# Patient Record
Sex: FEMALE | Race: WHITE | NOT HISPANIC OR LATINO
[De-identification: names, ages, dates, MRNs, and addresses within clinical notes are randomized per-mention and may not be internally consistent; named-entity substitution may affect disease eponyms.]

---

## 2020-10-22 ENCOUNTER — NON-APPOINTMENT (OUTPATIENT)
Age: 25
End: 2020-10-22

## 2020-10-22 ENCOUNTER — APPOINTMENT (OUTPATIENT)
Dept: OBGYN | Facility: CLINIC | Age: 25
End: 2020-10-22
Payer: COMMERCIAL

## 2020-10-22 VITALS
HEIGHT: 65 IN | BODY MASS INDEX: 28.49 KG/M2 | SYSTOLIC BLOOD PRESSURE: 118 MMHG | WEIGHT: 171 LBS | DIASTOLIC BLOOD PRESSURE: 72 MMHG

## 2020-10-22 PROCEDURE — 99204 OFFICE O/P NEW MOD 45 MIN: CPT

## 2020-10-22 PROCEDURE — 76815 OB US LIMITED FETUS(S): CPT

## 2020-10-22 PROCEDURE — 99072 ADDL SUPL MATRL&STAF TM PHE: CPT

## 2020-10-22 RX ORDER — PRENATAL WITH FERROUS FUM AND FOLIC ACID 3080; 920; 120; 400; 22; 1.84; 3; 20; 10; 1; 12; 200; 27; 25; 2 [IU]/1; [IU]/1; MG/1; [IU]/1; MG/1; MG/1; MG/1; MG/1; MG/1; MG/1; UG/1; MG/1; MG/1; MG/1; MG/1
27-1 TABLET ORAL DAILY
Qty: 100 | Refills: 2 | Status: ACTIVE | COMMUNITY
Start: 2020-10-22 | End: 1900-01-01

## 2020-10-23 DIAGNOSIS — N39.0 URINARY TRACT INFECTION, SITE NOT SPECIFIED: ICD-10-CM

## 2020-10-26 LAB — BACTERIA UR CULT: ABNORMAL

## 2020-10-26 RX ORDER — NITROFURANTOIN MACROCRYSTALS 100 MG/1
100 CAPSULE ORAL
Qty: 14 | Refills: 0 | Status: ACTIVE | COMMUNITY
Start: 2020-10-26 | End: 1900-01-01

## 2020-10-27 LAB
ABO + RH PNL BLD: NORMAL
APPEARANCE: CLEAR
BASOPHILS # BLD AUTO: 0.03 K/UL
BASOPHILS NFR BLD AUTO: 0.4 %
BILIRUBIN URINE: NEGATIVE
BLD GP AB SCN SERPL QL: NORMAL
BLOOD URINE: NEGATIVE
COLOR: YELLOW
EOSINOPHIL # BLD AUTO: 0.03 K/UL
EOSINOPHIL NFR BLD AUTO: 0.4 %
FMR1 GENE MUT ANL BLD/T: NORMAL
GLUCOSE 1H P 50 G GLC PO SERPL-MCNC: 124 MG/DL
GLUCOSE QUALITATIVE U: NEGATIVE
HBV SURFACE AG SER QL: NONREACTIVE
HCT VFR BLD CALC: 32.6 %
HGB A MFR BLD: 97.5 %
HGB A2 MFR BLD: 2.5 %
HGB BLD-MCNC: 10.5 G/DL
HGB FRACT BLD-IMP: NORMAL
HIV1+2 AB SPEC QL IA.RAPID: NONREACTIVE
IMM GRANULOCYTES NFR BLD AUTO: 0.4 %
KETONES URINE: NEGATIVE
LEAD BLD-MCNC: <1 UG/DL
LEUKOCYTE ESTERASE URINE: NEGATIVE
LYMPHOCYTES # BLD AUTO: 1.38 K/UL
LYMPHOCYTES NFR BLD AUTO: 20.2 %
M TB IFN-G BLD-IMP: NEGATIVE
MAN DIFF?: NORMAL
MCHC RBC-ENTMCNC: 30.6 PG
MCHC RBC-ENTMCNC: 32.2 G/DL
MCV RBC AUTO: 95 FL
MONOCYTES # BLD AUTO: 0.42 K/UL
MONOCYTES NFR BLD AUTO: 6.1 %
NEUTROPHILS # BLD AUTO: 4.95 K/UL
NEUTROPHILS NFR BLD AUTO: 72.5 %
NITRITE URINE: NEGATIVE
PH URINE: 8
PLATELET # BLD AUTO: 165 K/UL
PROTEIN URINE: NORMAL
QUANTIFERON TB PLUS MITOGEN MINUS NIL: 1.81 IU/ML
QUANTIFERON TB PLUS NIL: 0.01 IU/ML
QUANTIFERON TB PLUS TB1 MINUS NIL: 0 IU/ML
QUANTIFERON TB PLUS TB2 MINUS NIL: 0 IU/ML
RBC # BLD: 3.43 M/UL
RBC # FLD: 13.4 %
RUBV IGG FLD-ACNC: 2.5 INDEX
RUBV IGG SER-IMP: POSITIVE
SPECIFIC GRAVITY URINE: 1.02
T PALLIDUM AB SER QL IA: NEGATIVE
UROBILINOGEN URINE: NORMAL
VZV AB TITR SER: POSITIVE
VZV IGG SER IF-ACNC: 365.6 INDEX
WBC # FLD AUTO: 6.84 K/UL

## 2020-10-28 LAB
A VAGINAE DNA VAG QL NAA+PROBE: NORMAL
AR GENE MUT ANL BLD/T: NORMAL
BVAB2 DNA VAG QL NAA+PROBE: NORMAL
C KRUSEI DNA VAG QL NAA+PROBE: NEGATIVE
C TRACH RRNA SPEC QL NAA+PROBE: NEGATIVE
MEGA1 DNA VAG QL NAA+PROBE: ABNORMAL
N GONORRHOEA RRNA SPEC QL NAA+PROBE: NEGATIVE
T VAGINALIS RRNA SPEC QL NAA+PROBE: NEGATIVE

## 2020-11-02 LAB
CFTR MUT TESTED BLD/T: NEGATIVE
CLARI ADDITIONAL INFO: NORMAL
CLARI CHROMOSOME 13: NORMAL
CLARI CHROMOSOME 18: NORMAL
CLARI CHROMOSOME 21: NORMAL
CLARI SEX CHROMOSOMES: NORMAL
CLARITEST NIPT: NORMAL

## 2020-11-05 ENCOUNTER — NON-APPOINTMENT (OUTPATIENT)
Age: 25
End: 2020-11-05

## 2020-11-05 ENCOUNTER — APPOINTMENT (OUTPATIENT)
Dept: OBGYN | Facility: CLINIC | Age: 25
End: 2020-11-05
Payer: COMMERCIAL

## 2020-11-05 VITALS
BODY MASS INDEX: 26.99 KG/M2 | HEIGHT: 65 IN | SYSTOLIC BLOOD PRESSURE: 118 MMHG | DIASTOLIC BLOOD PRESSURE: 76 MMHG | WEIGHT: 162 LBS

## 2020-11-05 PROCEDURE — 0502F SUBSEQUENT PRENATAL CARE: CPT

## 2020-11-09 ENCOUNTER — OUTPATIENT (OUTPATIENT)
Dept: OUTPATIENT SERVICES | Facility: HOSPITAL | Age: 25
LOS: 1 days | Discharge: HOME | End: 2020-11-09

## 2020-11-09 ENCOUNTER — APPOINTMENT (OUTPATIENT)
Dept: ANTEPARTUM | Facility: CLINIC | Age: 25
End: 2020-11-09
Payer: COMMERCIAL

## 2020-11-09 ENCOUNTER — ASOB RESULT (OUTPATIENT)
Age: 25
End: 2020-11-09

## 2020-11-09 PROCEDURE — 76805 OB US >/= 14 WKS SNGL FETUS: CPT | Mod: 26

## 2020-11-19 ENCOUNTER — NON-APPOINTMENT (OUTPATIENT)
Age: 25
End: 2020-11-19

## 2020-11-19 ENCOUNTER — APPOINTMENT (OUTPATIENT)
Dept: OBGYN | Facility: CLINIC | Age: 25
End: 2020-11-19
Payer: COMMERCIAL

## 2020-11-19 VITALS
SYSTOLIC BLOOD PRESSURE: 110 MMHG | WEIGHT: 164 LBS | HEIGHT: 65 IN | BODY MASS INDEX: 27.32 KG/M2 | DIASTOLIC BLOOD PRESSURE: 66 MMHG

## 2020-11-19 PROCEDURE — 0502F SUBSEQUENT PRENATAL CARE: CPT

## 2020-11-22 LAB
GP B STREP DNA SPEC QL NAA+PROBE: NORMAL
GP B STREP DNA SPEC QL NAA+PROBE: NOT DETECTED
SOURCE GBS: NORMAL

## 2020-11-23 LAB
A VAGINAE DNA VAG QL NAA+PROBE: NORMAL
BVAB2 DNA VAG QL NAA+PROBE: NORMAL
C KRUSEI DNA VAG QL NAA+PROBE: NEGATIVE
C TRACH RRNA SPEC QL NAA+PROBE: NEGATIVE
MEGA1 DNA VAG QL NAA+PROBE: ABNORMAL
N GONORRHOEA RRNA SPEC QL NAA+PROBE: NEGATIVE
T VAGINALIS RRNA SPEC QL NAA+PROBE: NEGATIVE

## 2020-12-01 ENCOUNTER — OUTPATIENT (OUTPATIENT)
Dept: OUTPATIENT SERVICES | Facility: HOSPITAL | Age: 25
LOS: 1 days | Discharge: HOME | End: 2020-12-01

## 2020-12-01 ENCOUNTER — APPOINTMENT (OUTPATIENT)
Dept: ANTEPARTUM | Facility: CLINIC | Age: 25
End: 2020-12-01
Payer: COMMERCIAL

## 2020-12-01 ENCOUNTER — ASOB RESULT (OUTPATIENT)
Age: 25
End: 2020-12-01

## 2020-12-01 PROCEDURE — 76816 OB US FOLLOW-UP PER FETUS: CPT | Mod: 26

## 2020-12-01 PROCEDURE — 76819 FETAL BIOPHYS PROFIL W/O NST: CPT | Mod: 26

## 2020-12-03 ENCOUNTER — NON-APPOINTMENT (OUTPATIENT)
Age: 25
End: 2020-12-03

## 2020-12-03 ENCOUNTER — APPOINTMENT (OUTPATIENT)
Dept: OBGYN | Facility: CLINIC | Age: 25
End: 2020-12-03
Payer: COMMERCIAL

## 2020-12-03 VITALS
BODY MASS INDEX: 27.99 KG/M2 | HEIGHT: 65 IN | WEIGHT: 168 LBS | DIASTOLIC BLOOD PRESSURE: 62 MMHG | SYSTOLIC BLOOD PRESSURE: 118 MMHG

## 2020-12-03 PROCEDURE — 0502F SUBSEQUENT PRENATAL CARE: CPT

## 2020-12-03 RX ORDER — PRENATAL WITH FERROUS FUM AND FOLIC ACID 3080; 920; 120; 400; 22; 1.84; 3; 20; 10; 1; 12; 200; 27; 25; 2 [IU]/1; [IU]/1; MG/1; [IU]/1; MG/1; MG/1; MG/1; MG/1; MG/1; MG/1; UG/1; MG/1; MG/1; MG/1; MG/1
27-1 TABLET ORAL DAILY
Qty: 30 | Refills: 11 | Status: ACTIVE | COMMUNITY
Start: 2020-12-03 | End: 1900-01-01

## 2020-12-07 ENCOUNTER — APPOINTMENT (OUTPATIENT)
Dept: ANTEPARTUM | Facility: CLINIC | Age: 25
End: 2020-12-07

## 2020-12-10 ENCOUNTER — APPOINTMENT (OUTPATIENT)
Dept: OBGYN | Facility: CLINIC | Age: 25
End: 2020-12-10
Payer: COMMERCIAL

## 2020-12-10 ENCOUNTER — NON-APPOINTMENT (OUTPATIENT)
Age: 25
End: 2020-12-10

## 2020-12-10 VITALS
BODY MASS INDEX: 28.32 KG/M2 | WEIGHT: 170 LBS | SYSTOLIC BLOOD PRESSURE: 122 MMHG | HEIGHT: 65 IN | DIASTOLIC BLOOD PRESSURE: 78 MMHG

## 2020-12-10 PROCEDURE — ZZZZZ: CPT

## 2020-12-13 ENCOUNTER — NON-APPOINTMENT (OUTPATIENT)
Age: 25
End: 2020-12-13

## 2020-12-21 ENCOUNTER — RESULT CHARGE (OUTPATIENT)
Age: 25
End: 2020-12-21

## 2020-12-21 ENCOUNTER — NON-APPOINTMENT (OUTPATIENT)
Age: 25
End: 2020-12-21

## 2020-12-21 ENCOUNTER — APPOINTMENT (OUTPATIENT)
Dept: OBGYN | Facility: CLINIC | Age: 25
End: 2020-12-21
Payer: COMMERCIAL

## 2020-12-21 ENCOUNTER — OUTPATIENT (OUTPATIENT)
Dept: OUTPATIENT SERVICES | Facility: HOSPITAL | Age: 25
LOS: 1 days | Discharge: HOME | End: 2020-12-21

## 2020-12-21 VITALS — SYSTOLIC BLOOD PRESSURE: 120 MMHG | DIASTOLIC BLOOD PRESSURE: 70 MMHG | WEIGHT: 173 LBS | BODY MASS INDEX: 28.79 KG/M2

## 2020-12-21 DIAGNOSIS — Z34.90 ENCOUNTER FOR SUPERVISION OF NORMAL PREGNANCY, UNSPECIFIED, UNSPECIFIED TRIMESTER: ICD-10-CM

## 2020-12-21 LAB
BILIRUB UR QL STRIP: NORMAL
CLARITY UR: CLEAR
COLLECTION METHOD: NORMAL
GLUCOSE UR-MCNC: NORMAL
HCG UR QL: 0.2 EU/DL
HGB UR QL STRIP.AUTO: NORMAL
KETONES UR-MCNC: NORMAL
LEUKOCYTE ESTERASE UR QL STRIP: NORMAL
NITRITE UR QL STRIP: NORMAL
PH UR STRIP: 6
PROT UR STRIP-MCNC: NORMAL
SP GR UR STRIP: 1.03

## 2020-12-21 PROCEDURE — 59425 ANTEPARTUM CARE ONLY: CPT

## 2020-12-21 PROCEDURE — 0502F SUBSEQUENT PRENATAL CARE: CPT

## 2020-12-23 ENCOUNTER — INPATIENT (INPATIENT)
Facility: HOSPITAL | Age: 25
LOS: 1 days | Discharge: HOME | End: 2020-12-25
Attending: OBSTETRICS & GYNECOLOGY | Admitting: OBSTETRICS & GYNECOLOGY
Payer: COMMERCIAL

## 2020-12-23 VITALS — DIASTOLIC BLOOD PRESSURE: 75 MMHG | HEART RATE: 97 BPM | SYSTOLIC BLOOD PRESSURE: 123 MMHG

## 2020-12-23 DIAGNOSIS — Z90.3 ACQUIRED ABSENCE OF STOMACH [PART OF]: Chronic | ICD-10-CM

## 2020-12-23 PROBLEM — N39.0 ACUTE UTI: Status: RESOLVED | Noted: 2020-10-26 | Resolved: 2020-12-23

## 2020-12-23 LAB
AMPHET UR-MCNC: NEGATIVE — SIGNIFICANT CHANGE UP
APPEARANCE UR: CLEAR — SIGNIFICANT CHANGE UP
BARBITURATES UR SCN-MCNC: NEGATIVE — SIGNIFICANT CHANGE UP
BASOPHILS # BLD AUTO: 0.03 K/UL — SIGNIFICANT CHANGE UP (ref 0–0.2)
BASOPHILS NFR BLD AUTO: 0.4 % — SIGNIFICANT CHANGE UP (ref 0–1)
BENZODIAZ UR-MCNC: NEGATIVE — SIGNIFICANT CHANGE UP
BILIRUB UR-MCNC: NEGATIVE — SIGNIFICANT CHANGE UP
BLD GP AB SCN SERPL QL: SIGNIFICANT CHANGE UP
BUPRENORPHINE SCREEN, URINE RESULT: NEGATIVE — SIGNIFICANT CHANGE UP
COCAINE METAB.OTHER UR-MCNC: NEGATIVE — SIGNIFICANT CHANGE UP
COLOR SPEC: SIGNIFICANT CHANGE UP
DIFF PNL FLD: NEGATIVE — SIGNIFICANT CHANGE UP
EOSINOPHIL # BLD AUTO: 0.04 K/UL — SIGNIFICANT CHANGE UP (ref 0–0.7)
EOSINOPHIL NFR BLD AUTO: 0.6 % — SIGNIFICANT CHANGE UP (ref 0–8)
GLUCOSE UR QL: NEGATIVE — SIGNIFICANT CHANGE UP
HCT VFR BLD CALC: 32.5 % — LOW (ref 37–47)
HGB BLD-MCNC: 10.7 G/DL — LOW (ref 12–16)
IMM GRANULOCYTES NFR BLD AUTO: 0.7 % — HIGH (ref 0.1–0.3)
KETONES UR-MCNC: NEGATIVE — SIGNIFICANT CHANGE UP
L&D DRUG SCREEN, URINE: SIGNIFICANT CHANGE UP
LEUKOCYTE ESTERASE UR-ACNC: NEGATIVE — SIGNIFICANT CHANGE UP
LYMPHOCYTES # BLD AUTO: 2 K/UL — SIGNIFICANT CHANGE UP (ref 1.2–3.4)
LYMPHOCYTES # BLD AUTO: 29.4 % — SIGNIFICANT CHANGE UP (ref 20.5–51.1)
MCHC RBC-ENTMCNC: 29.8 PG — SIGNIFICANT CHANGE UP (ref 27–31)
MCHC RBC-ENTMCNC: 32.9 G/DL — SIGNIFICANT CHANGE UP (ref 32–37)
MCV RBC AUTO: 90.5 FL — SIGNIFICANT CHANGE UP (ref 81–99)
METHADONE UR-MCNC: NEGATIVE — SIGNIFICANT CHANGE UP
MONOCYTES # BLD AUTO: 0.59 K/UL — SIGNIFICANT CHANGE UP (ref 0.1–0.6)
MONOCYTES NFR BLD AUTO: 8.7 % — SIGNIFICANT CHANGE UP (ref 1.7–9.3)
NEUTROPHILS # BLD AUTO: 4.09 K/UL — SIGNIFICANT CHANGE UP (ref 1.4–6.5)
NEUTROPHILS NFR BLD AUTO: 60.2 % — SIGNIFICANT CHANGE UP (ref 42.2–75.2)
NITRITE UR-MCNC: NEGATIVE — SIGNIFICANT CHANGE UP
NRBC # BLD: 0 /100 WBCS — SIGNIFICANT CHANGE UP (ref 0–0)
OPIATES UR-MCNC: NEGATIVE — SIGNIFICANT CHANGE UP
OXYCODONE UR-MCNC: NEGATIVE — SIGNIFICANT CHANGE UP
PCP UR-MCNC: NEGATIVE — SIGNIFICANT CHANGE UP
PH UR: 7.5 — SIGNIFICANT CHANGE UP (ref 5–8)
PLATELET # BLD AUTO: 195 K/UL — SIGNIFICANT CHANGE UP (ref 130–400)
PRENATAL SYPHILIS TEST: SIGNIFICANT CHANGE UP
PROPOXYPHENE QUALITATIVE URINE RESULT: NEGATIVE — SIGNIFICANT CHANGE UP
PROT UR-MCNC: SIGNIFICANT CHANGE UP
RBC # BLD: 3.59 M/UL — LOW (ref 4.2–5.4)
RBC # FLD: 12.9 % — SIGNIFICANT CHANGE UP (ref 11.5–14.5)
SARS-COV-2 RNA SPEC QL NAA+PROBE: SIGNIFICANT CHANGE UP
SP GR SPEC: 1.02 — SIGNIFICANT CHANGE UP (ref 1.01–1.03)
UROBILINOGEN FLD QL: SIGNIFICANT CHANGE UP
WBC # BLD: 6.8 K/UL — SIGNIFICANT CHANGE UP (ref 4.8–10.8)
WBC # FLD AUTO: 6.8 K/UL — SIGNIFICANT CHANGE UP (ref 4.8–10.8)

## 2020-12-23 RX ORDER — SODIUM CHLORIDE 9 MG/ML
1000 INJECTION, SOLUTION INTRAVENOUS
Refills: 0 | Status: DISCONTINUED | OUTPATIENT
Start: 2020-12-23 | End: 2020-12-24

## 2020-12-23 RX ORDER — DINOPROSTONE 10 MG/241MG
10 INSERT VAGINAL ONCE
Refills: 0 | Status: COMPLETED | OUTPATIENT
Start: 2020-12-23 | End: 2020-12-23

## 2020-12-23 RX ORDER — OXYTOCIN 10 UNIT/ML
333.33 VIAL (ML) INJECTION
Qty: 20 | Refills: 0 | Status: DISCONTINUED | OUTPATIENT
Start: 2020-12-23 | End: 2020-12-24

## 2020-12-23 RX ADMIN — DINOPROSTONE 10 MILLIGRAM(S): 10 INSERT VAGINAL at 10:30

## 2020-12-23 NOTE — PROGRESS NOTE ADULT - ASSESSMENT
25y  at 40w5d, GBS neg, here for IOL for postdates, s/p cervidil now removed, will have patient rest for 1 hr before starting pitocin, in labor progressing well.  -Continue current management   -Pain management prn  -Continuous EFM/toco  -F/u pending labs  -Reevaluate      and  aware

## 2020-12-23 NOTE — PROGRESS NOTE ADULT - SUBJECTIVE AND OBJECTIVE BOX
PGY1 Note    Patient seen at bedside for evaluation of labor progression. patient currently undergoing resuscitation with oxygen.    T(F): 98.6 ( @ 19:14), Max: 98.6 ( @ 19:14)  HR: 80 ( @ 21:54)  BP: 126/68 ( @ 21:54) (111/75 - 137/78)  RR: 20 ( @ 08:48)  EFM: 140bpm/moderate variability/+accelerations/ intermittent late and early decelerations  TOCO: q2mins  SVE: 3-4/80/-2 vtx ruptured    Medications:  ([place]): 1 ( @ 18:56)  dinoprostone Insert: 10 ( @ 10:30)  epidural @1915    Labs:                        10.7   6.80  )-----------( 195      ( 23 Dec 2020 09:26 )             32.5           Prenatal Syphilis Test: Nonreact ( @ 09:26)  Antibody Screen: NEG (20 @ 09:52)    Urinalysis Basic - ( 23 Dec 2020 13:31 )    Color: Light Yellow / Appearance: Clear / S.016 / pH: x  Gluc: x / Ketone: Negative  / Bili: Negative / Urobili: <2 mg/dL   Blood: x / Protein: Trace / Nitrite: Negative   Leuk Esterase: Negative / RBC: x / WBC x   Sq Epi: x / Non Sq Epi: x / Bacteria: x      ABO: A pos antibody neg  UDS neg f/u fentanyl

## 2020-12-23 NOTE — PROGRESS NOTE ADULT - ASSESSMENT
26yo  @ 40w1d by 3rd trimester sono, GBS negative, late registrant, with cervidil in place for post-EDC IOL.    - Cont EFM/[place]  - IV Hydration  - Pain Management prn  - Monitor vitals  - Clear Liquids    Dr. Adhikari and Dr. Castro aware

## 2020-12-23 NOTE — PROGRESS NOTE ADULT - ASSESSMENT
25y  at 40w5d, GBS neg, here for IOL for postdates, s/p cervidil and epidural, with category 2 tracing, undergoing resuscitation.  -Continue to resuscitate as needed  -Pain management with epidural  -Continuous EFM/toco  -IVF hydration, clears  -F/u fentanyl in UDS  -Reevaluate      and Dr. Robledo aware   25y  at 40w5d, GBS neg, here for IOL for postdates, s/p cervidil and epidural, with category 2 tracing, undergoing resuscitation.  -Continue to resuscitate as needed  -Pain management with epidural  -Continuous EFM/toco  -IVF hydration, clears  -F/u fentanyl in UDS  -Reevaluate .     and Dr. Robledo aware

## 2020-12-23 NOTE — PROGRESS NOTE ADULT - SUBJECTIVE AND OBJECTIVE BOX
PGY I Note    S: Patient seen and examined at bedside. Doing well, does not desire pain control at this time.    Vital Signs Last 24 Hrs  T(C): 36.9 (23 Dec 2020 08:48), Max: 36.9 (23 Dec 2020 08:48)  T(F): 98.4 (23 Dec 2020 08:48), Max: 98.4 (23 Dec 2020 08:48)  HR: 79 (23 Dec 2020 10:12) (79 - 97)  BP: 125/84 (23 Dec 2020 10:12) (123/75 - 125/84)  RR: 20 (23 Dec 2020 08:48) (20 - 20)    EFM: 130/mod/+accels  TOCO: irregular  SVE: c/l/p per PA Panetelios    Labs:                        10.7   6.80  )-----------( 195      ( 23 Dec 2020 09:26 )             32.5             ABO RH Interpretation: A POS (20 @ 09:52)    Urinalysis Basic - ( 23 Dec 2020 13:31 )    Color: Light Yellow / Appearance: Clear / S.016 / pH: x  Gluc: x / Ketone: Negative  / Bili: Negative / Urobili: <2 mg/dL   Blood: x / Protein: Trace / Nitrite: Negative   Leuk Esterase: Negative / RBC: x / WBC x   Sq Epi: x / Non Sq Epi: x / Bacteria: x        Prenatal Syphilis Test: Nonreact (20 @ 09:26)      UDS: pending  Covid: neg    Meds:  cervidil in @1030

## 2020-12-23 NOTE — OB PROVIDER H&P - ASSESSMENT
25y.o.  @ 40.1wks by 3rd trimester sono, GBS negative, late registrant  Admit to L&D  IVF, labs  Continuous efm and toco  Pain management PRN  Anticipate   Dr. Zeynep Gray

## 2020-12-23 NOTE — PROCEDURE NOTE - ADDITIONAL PROCEDURE DETAILS
00.05am Top off given to pt. 100mcg fentanyl with 5cc .25% bupivacaine. 00.05am Top off given to pt. 100mcg fentanyl with 5cc .25% bupivacaine.  06.30am Top off given to pt. 100mcg fentanyl with 5cc .2% lidocaine. 00.05am Top off given to pt. 100mcg fentanyl with 5cc .25% bupivacaine.  06.30am Top off given to pt. 100mcg fentanyl with 5cc .2% lidocaine.  0810 am Top off given to pt. 2% chloroprocaine 9 cc, 8.4% sodium bicarbonate 1cc

## 2020-12-23 NOTE — PROGRESS NOTE ADULT - SUBJECTIVE AND OBJECTIVE BOX
PGY1 Note    Patient seen at bedside for labor progression. Complaining of increased pain.     T(F): 98.4 ( @ 08:48), Max: 98.4 ( @ 08:48)  HR: 70 ( @ 16:04)  BP: 129/79 ( @ 16:04) (123/75 - 129/79)  RR: 20 ( @ 08:48)    EFM: 135/mod rachael/+accels  TOCO: q1-3min   SVE: 2/50/-3/vertex/intact    Medications:  dinoprostone Insert: 10 ( @ 10:30), removed @1750      Labs:                        10.7   6.80  )-----------( 195      ( 23 Dec 2020 09:26 )             32.5           Prenatal Syphilis Test: Nonreact ( @ 09:26)  Antibody Screen: NEG (20 @ 09:52)    Urinalysis Basic - ( 23 Dec 2020 13:31 )    Color: Light Yellow / Appearance: Clear / S.016 / pH: x  Gluc: x / Ketone: Negative  / Bili: Negative / Urobili: <2 mg/dL   Blood: x / Protein: Trace / Nitrite: Negative   Leuk Esterase: Negative / RBC: x / WBC x   Sq Epi: x / Non Sq Epi: x / Bacteria: x    covid neg  UDS pending (done)

## 2020-12-23 NOTE — OB PROVIDER H&P - NSHPPHYSICALEXAM_GEN_ALL_CORE
T(C): 36.9 (12-23-20 @ 08:48), Max: 36.9 (12-23-20 @ 08:48)  HR: 97 (12-23-20 @ 08:48) (97 - 97)  BP: 123/75 (12-23-20 @ 08:48) (123/75 - 123/75)  RR: 20 (12-23-20 @ 08:48) (20 - 20)    VE:   FHR: 130 moderate variability, cat I  ctx: none noted T(C): 36.9 (12-23-20 @ 08:48), Max: 36.9 (12-23-20 @ 08:48)  HR: 97 (12-23-20 @ 08:48) (97 - 97)  BP: 123/75 (12-23-20 @ 08:48) (123/75 - 123/75)  RR: 20 (12-23-20 @ 08:48) (20 - 20)    VE: 0/0/-3/firm/post  FHR: 130 moderate variability, cat I  ctx: none noted

## 2020-12-23 NOTE — OB PROVIDER H&P - NSHPLABSRESULTS_GEN_ALL_CORE
Sono @ 31.6wks for dating  Sono @ 34.2wks S=D, nl anatomy, vtx, ant placenta, EFW: 2254g, 5lb1oz (31%)  Sono @ 37.3wks S=D, EFW: 2927g, 6lb7oz (31%)        NIPT WNL

## 2020-12-24 PROCEDURE — 59409 OBSTETRICAL CARE: CPT | Mod: U9

## 2020-12-24 RX ORDER — LANOLIN
1 OINTMENT (GRAM) TOPICAL EVERY 6 HOURS
Refills: 0 | Status: DISCONTINUED | OUTPATIENT
Start: 2020-12-24 | End: 2020-12-25

## 2020-12-24 RX ORDER — DIPHENHYDRAMINE HCL 50 MG
25 CAPSULE ORAL EVERY 6 HOURS
Refills: 0 | Status: DISCONTINUED | OUTPATIENT
Start: 2020-12-24 | End: 2020-12-25

## 2020-12-24 RX ORDER — IBUPROFEN 200 MG
600 TABLET ORAL EVERY 6 HOURS
Refills: 0 | Status: DISCONTINUED | OUTPATIENT
Start: 2020-12-24 | End: 2020-12-25

## 2020-12-24 RX ORDER — AER TRAVELER 0.5 G/1
1 SOLUTION RECTAL; TOPICAL EVERY 4 HOURS
Refills: 0 | Status: DISCONTINUED | OUTPATIENT
Start: 2020-12-24 | End: 2020-12-25

## 2020-12-24 RX ORDER — SIMETHICONE 80 MG/1
80 TABLET, CHEWABLE ORAL EVERY 4 HOURS
Refills: 0 | Status: DISCONTINUED | OUTPATIENT
Start: 2020-12-24 | End: 2020-12-25

## 2020-12-24 RX ORDER — OXYCODONE HYDROCHLORIDE 5 MG/1
5 TABLET ORAL
Refills: 0 | Status: DISCONTINUED | OUTPATIENT
Start: 2020-12-24 | End: 2020-12-25

## 2020-12-24 RX ORDER — OXYTOCIN 10 UNIT/ML
41.67 VIAL (ML) INJECTION
Qty: 20 | Refills: 0 | Status: DISCONTINUED | OUTPATIENT
Start: 2020-12-24 | End: 2020-12-25

## 2020-12-24 RX ORDER — DIBUCAINE 1 %
1 OINTMENT (GRAM) RECTAL EVERY 6 HOURS
Refills: 0 | Status: DISCONTINUED | OUTPATIENT
Start: 2020-12-24 | End: 2020-12-25

## 2020-12-24 RX ORDER — ACETAMINOPHEN 500 MG
975 TABLET ORAL
Refills: 0 | Status: DISCONTINUED | OUTPATIENT
Start: 2020-12-24 | End: 2020-12-25

## 2020-12-24 RX ORDER — OXYCODONE HYDROCHLORIDE 5 MG/1
5 TABLET ORAL ONCE
Refills: 0 | Status: DISCONTINUED | OUTPATIENT
Start: 2020-12-24 | End: 2020-12-25

## 2020-12-24 RX ORDER — IBUPROFEN 200 MG
600 TABLET ORAL EVERY 6 HOURS
Refills: 0 | Status: COMPLETED | OUTPATIENT
Start: 2020-12-24 | End: 2021-11-22

## 2020-12-24 RX ORDER — OXYTOCIN 10 UNIT/ML
2 VIAL (ML) INJECTION
Qty: 30 | Refills: 0 | Status: DISCONTINUED | OUTPATIENT
Start: 2020-12-24 | End: 2020-12-24

## 2020-12-24 RX ORDER — HYDROCORTISONE 1 %
1 OINTMENT (GRAM) TOPICAL EVERY 6 HOURS
Refills: 0 | Status: DISCONTINUED | OUTPATIENT
Start: 2020-12-24 | End: 2020-12-25

## 2020-12-24 RX ORDER — MAGNESIUM HYDROXIDE 400 MG/1
30 TABLET, CHEWABLE ORAL
Refills: 0 | Status: DISCONTINUED | OUTPATIENT
Start: 2020-12-24 | End: 2020-12-25

## 2020-12-24 RX ORDER — KETOROLAC TROMETHAMINE 30 MG/ML
30 SYRINGE (ML) INJECTION ONCE
Refills: 0 | Status: DISCONTINUED | OUTPATIENT
Start: 2020-12-24 | End: 2020-12-24

## 2020-12-24 RX ORDER — BENZOCAINE 10 %
1 GEL (GRAM) MUCOUS MEMBRANE EVERY 6 HOURS
Refills: 0 | Status: DISCONTINUED | OUTPATIENT
Start: 2020-12-24 | End: 2020-12-25

## 2020-12-24 RX ADMIN — Medication 2 MILLIUNIT(S)/MIN: at 03:00

## 2020-12-24 RX ADMIN — Medication 30 MILLIGRAM(S): at 10:47

## 2020-12-24 NOTE — PROGRESS NOTE ADULT - ASSESSMENT
25y  at 40w6d, GBS neg, s/p cervidil, on pitocin for IOL for Post-EDC, now fully dilated.     Will call for top-off to better control patient pain prior to pushing.    - Cont EFM/[place]  - IV Hydration  - Pain Management epidural  - Monitor vitals  - Clear Liquids    Dr. Garcia and Dr. Castro aware

## 2020-12-24 NOTE — PROGRESS NOTE ADULT - SUBJECTIVE AND OBJECTIVE BOX
PGY1 Note    Patient seen at bedside for increased pelvic pain with contractions. Patient still undergoing resuscitation with oxygen    T(F): 98.6 ( @ 19:14), Max: 98.6 ( @ 19:14)  HR: 80 ( @ 23:53)  BP: 133/91 ( @ 00:11) (111/75 - 137/78)  RR: 20 ( @ 08:48)  EFM:125bpm/moderate variability/+accelerations/intermittent late and early decelerations  TOCO: q1-2mins  SVE:4/80/-2 vtx ruptured    Medications:      Labs:                        10.7   6.80  )-----------( 195      ( 23 Dec 2020 09:26 )             32.5           Prenatal Syphilis Test: Nonreact ( @ 09:26)  Antibody Screen: NEG (20 @ 09:52)    Urinalysis Basic - ( 23 Dec 2020 13:31 )    Color: Light Yellow / Appearance: Clear / S.016 / pH: x  Gluc: x / Ketone: Negative  / Bili: Negative / Urobili: <2 mg/dL   Blood: x / Protein: Trace / Nitrite: Negative   Leuk Esterase: Negative / RBC: x / WBC x   Sq Epi: x / Non Sq Epi: x / Bacteria: x      ABO: A pos antibody neg  UDS neg f/u fentanyl         PGY1 Note    Patient seen at bedside for increased pelvic pain with contractions. Patient still undergoing resuscitation with oxygen    T(F): 98.6 ( @ 19:14), Max: 98.6 ( @ 19:14)  HR: 80 ( @ 23:53)  BP: 133/91 ( @ 00:11) (111/75 - 137/78)  RR: 20 ( @ 08:48)  EFM:125bpm/moderate variability/+accelerations/ early decelerations  TOCO: q1-2mins  SVE:4/80/-2 vtx ruptured    Medications:      Labs:                        10.7   6.80  )-----------( 195      ( 23 Dec 2020 09:26 )             32.5           Prenatal Syphilis Test: Nonreact ( @ 09:26)  Antibody Screen: NEG (20 @ 09:52)    Urinalysis Basic - ( 23 Dec 2020 13:31 )    Color: Light Yellow / Appearance: Clear / S.016 / pH: x  Gluc: x / Ketone: Negative  / Bili: Negative / Urobili: <2 mg/dL   Blood: x / Protein: Trace / Nitrite: Negative   Leuk Esterase: Negative / RBC: x / WBC x   Sq Epi: x / Non Sq Epi: x / Bacteria: x      ABO: A pos antibody neg  UDS neg f/u fentanyl

## 2020-12-24 NOTE — PROGRESS NOTE ADULT - SUBJECTIVE AND OBJECTIVE BOX
PGY2 Note    Patient seen at bedside. No complaints at the moment.    T(F): 98.6 ( @ 19:14), Max: 98.6 ( @ 19:14)  HR: 71 ( @ 02:24)  BP: 115/69 ( @ 02:39) (102/60 - 137/78)  RR: 20 ( @ 08:48)    EFM: 125bpm/moderate variability/+decels  TOCO: q2-4mins  SVE: 4/80/-2, vtx, ruptured    Medications:  dinoprostone Insert: 10 ( @ 10:30)      Labs:                        10.7   6.80  )-----------( 195      ( 23 Dec 2020 09:26 )             32.5           Prenatal Syphilis Test: Nonreact ( @ 09:26)  Antibody Screen: NEG (20 @ 09:52)    Urinalysis Basic - ( 23 Dec 2020 13:31 )    Color: Light Yellow / Appearance: Clear / S.016 / pH: x  Gluc: x / Ketone: Negative  / Bili: Negative / Urobili: <2 mg/dL   Blood: x / Protein: Trace / Nitrite: Negative   Leuk Esterase: Negative / RBC: x / WBC x   Sq Epi: x / Non Sq Epi: x / Bacteria: x         PGY2 Note    Patient seen at bedside. No complaints at the moment.    T(F): 98.6 ( @ 19:14), Max: 98.6 ( @ 19:14)  HR: 71 ( @ 02:24)  BP: 115/69 ( @ 02:39) (102/60 - 137/78)  RR: 20 ( @ 08:48)    EFM: 125bpm/moderate variability/+accels/ early decelerations  TOCO: q2-4mins  SVE: 4/80/-2, vtx, ruptured    Medications:  dinoprostone Insert: 10 ( @ 10:30)      Labs:                        10.7   6.80  )-----------( 195      ( 23 Dec 2020 09:26 )             32.5           Prenatal Syphilis Test: Nonreact ( @ 09:26)  Antibody Screen: NEG (20 @ 09:52)    Urinalysis Basic - ( 23 Dec 2020 13:31 )    Color: Light Yellow / Appearance: Clear / S.016 / pH: x  Gluc: x / Ketone: Negative  / Bili: Negative / Urobili: <2 mg/dL   Blood: x / Protein: Trace / Nitrite: Negative   Leuk Esterase: Negative / RBC: x / WBC x   Sq Epi: x / Non Sq Epi: x / Bacteria: x

## 2020-12-24 NOTE — PROGRESS NOTE ADULT - SUBJECTIVE AND OBJECTIVE BOX
Pt complaining of pain    ICU Vital Signs Last 24 Hrs  T(C): 37.0 (24 Dec 2020 07:09), Max: 37.0 (23 Dec 2020 19:14)  T(F): 98.6 (24 Dec 2020 07:09), Max: 98.6 (23 Dec 2020 19:14)  HR: 83 (24 Dec 2020 08:25) (57 - 109)  BP: 125/77 (24 Dec 2020 08:25) (102/60 - 141/82)    RR: 20 (23 Dec 2020 08:48) (20 - 20)      TOCO Q 3- 4   VE 9.5/100/-0 vertex    A/P  26 y/o  @ 40.6, s/p cervidil IOL  Patient will rest and will reevaluate when pressure increases  - IVH   - efm & toco monitoring  - analgesia prn  - Dr Garcia aware

## 2020-12-24 NOTE — PROGRESS NOTE ADULT - ASSESSMENT
25y  at 40w6d GA, GBS neg, here for IOL for postdates, s/p cervidil, s/p AROM; induction progressing well   -start pitocin   -Pain management prn   -Continuous EFM/toco  -IVF hydration  -diet: clears  -F/u fentanyl in UDS  -COVID-19 neg     Dr. Robledo and Dr. Gonsalves aware  25y  at 40w6d GA, GBS neg, here for IOL for postdates, s/p cervidil, s/p AROM;     -start pitocin   -Pain management prn   -Continuous EFM/toco  -IVF hydration  -diet: clear liquid diet  -F/u fentanyl in UDS  -COVID-19 neg     Dr. Robledo and Dr. Gonsalves aware

## 2020-12-24 NOTE — PROGRESS NOTE ADULT - SUBJECTIVE AND OBJECTIVE BOX
PGY I Note    S: Patient seen and examined at bedside. Strong pain with contractions.    Vital Signs Last 24 Hrs  T(C): 37.0 (24 Dec 2020 07:09), Max: 37.0 (23 Dec 2020 19:14)  T(F): 98.6 (24 Dec 2020 07:09), Max: 98.6 (23 Dec 2020 19:14)  HR: 80 (24 Dec 2020 08:10) (57 - 109)  BP: 113/71 (24 Dec 2020 08:10) (102/60 - 141/82)  RR: 20 (23 Dec 2020 08:48) (20 - 20)    EFM: 130/mod/+accels  TOCO: q2m  SVE: 10/100/+1, vertex, ruptured    Labs:                        10.7   6.80  )-----------( 195      ( 23 Dec 2020 09:26 )             32.5         ABO RH Interpretation: A POS (20 @ 09:52)    Urinalysis Basic - ( 23 Dec 2020 13:31 )    Color: Light Yellow / Appearance: Clear / S.016 / pH: x  Gluc: x / Ketone: Negative  / Bili: Negative / Urobili: <2 mg/dL   Blood: x / Protein: Trace / Nitrite: Negative   Leuk Esterase: Negative / RBC: x / WBC x   Sq Epi: x / Non Sq Epi: x / Bacteria: x      Prenatal Syphilis Test: Nonreact (20 @ 09:26)    UDS: neg  Covid: neg    Meds:  Epi: @1915  Pitocin: @0256, currently @4mU/min

## 2020-12-24 NOTE — PROGRESS NOTE ADULT - ASSESSMENT
25y  at 40w5d, GBS neg, here for IOL for postdates, s/p cervidil and epidural, with category 2 tracing, undergoing resuscitation.  -Continue to resuscitate as needed  -Pain management with epidural. Patient to receive top off  -Continuous EFM/toco  -IVF hydration, clears  -F/u fentanyl in UDS  -Reevaluate      and Dr. Robledo aware   25y  at 40w5d, GBS neg, here for IOL for postdates, s/p cervidil and epidural, with category 2 tracing, undergoing resuscitation.  -Continue to resuscitate as needed  -Pain management with epidural. Patient to receive top off  -will consider pitocin if failure to progress  -Continuous EFM/toco  -IVF hydration, clears  -F/u fentanyl in UDS  -Reevaluate      and Dr. Robledo aware   25y  at 40w5d, GBS neg, here for IOL for postdates, s/p cervidil and epidural,   -Continue to resuscitate as needed  -Pain management with epidural. Patient to receive top off  -will consider pitocin if failure to progress  -Continuous EFM/toco  -IVF hydration, clears  -F/u fentanyl in UDS  -Reevaluate      and Dr. Robledo aware   25y  at 40w6d, GBS neg, here for IOL for postdates, s/p cervidil and epidural,   -Continue to resuscitate as needed  -Pain management with epidural. Patient to receive top off  -will consider pitocin if failure to progress  -Continuous EFM/toco  -IVF hydration, clears  -F/u fentanyl in UDS  -Reevaluate      and Dr. Robledo aware

## 2020-12-24 NOTE — OB PROVIDER DELIVERY SUMMARY - NSPROVIDERDELIVERYNOTE_OBGYN_ALL_OB_FT
Patient was fully dilated and pushing. Fetal head was OA and restituted to ROT. The anterior and posterior shoulders delivered, followed by the remaining body atraumatically. Delayed cord clamping was performed, and then clamped and cut. The  was handed to the mother and RN. The placenta delivered intact with membranes. Pitocin was administered. Uterus massaged, fundus found to be firm. Cervix, vagina and perineum inspected, 2nd degree laceration was noted, repaired using 2-0 chromic in the usual fashion with good hemostasis.     Viable male infant delivered, weighing 6 lbs, with APGARs 9/9    Laceration: 2nd degree   EBL 300cc    Dr. Gonsalves present for the delivery

## 2020-12-25 ENCOUNTER — TRANSCRIPTION ENCOUNTER (OUTPATIENT)
Age: 25
End: 2020-12-25

## 2020-12-25 VITALS
TEMPERATURE: 98 F | RESPIRATION RATE: 18 BRPM | SYSTOLIC BLOOD PRESSURE: 116 MMHG | DIASTOLIC BLOOD PRESSURE: 82 MMHG | HEART RATE: 95 BPM

## 2020-12-25 LAB
BASOPHILS # BLD AUTO: 0.02 K/UL — SIGNIFICANT CHANGE UP (ref 0–0.2)
BASOPHILS NFR BLD AUTO: 0.2 % — SIGNIFICANT CHANGE UP (ref 0–1)
EOSINOPHIL # BLD AUTO: 0.03 K/UL — SIGNIFICANT CHANGE UP (ref 0–0.7)
EOSINOPHIL NFR BLD AUTO: 0.3 % — SIGNIFICANT CHANGE UP (ref 0–8)
HCT VFR BLD CALC: 25.9 % — LOW (ref 37–47)
HGB BLD-MCNC: 8.5 G/DL — LOW (ref 12–16)
IMM GRANULOCYTES NFR BLD AUTO: 0.4 % — HIGH (ref 0.1–0.3)
LYMPHOCYTES # BLD AUTO: 2.12 K/UL — SIGNIFICANT CHANGE UP (ref 1.2–3.4)
LYMPHOCYTES # BLD AUTO: 23.3 % — SIGNIFICANT CHANGE UP (ref 20.5–51.1)
MCHC RBC-ENTMCNC: 30 PG — SIGNIFICANT CHANGE UP (ref 27–31)
MCHC RBC-ENTMCNC: 32.8 G/DL — SIGNIFICANT CHANGE UP (ref 32–37)
MCV RBC AUTO: 91.5 FL — SIGNIFICANT CHANGE UP (ref 81–99)
MONOCYTES # BLD AUTO: 0.84 K/UL — HIGH (ref 0.1–0.6)
MONOCYTES NFR BLD AUTO: 9.2 % — SIGNIFICANT CHANGE UP (ref 1.7–9.3)
NEUTROPHILS # BLD AUTO: 6.06 K/UL — SIGNIFICANT CHANGE UP (ref 1.4–6.5)
NEUTROPHILS NFR BLD AUTO: 66.6 % — SIGNIFICANT CHANGE UP (ref 42.2–75.2)
NRBC # BLD: 0 /100 WBCS — SIGNIFICANT CHANGE UP (ref 0–0)
PLATELET # BLD AUTO: 144 K/UL — SIGNIFICANT CHANGE UP (ref 130–400)
RBC # BLD: 2.83 M/UL — LOW (ref 4.2–5.4)
RBC # FLD: 13 % — SIGNIFICANT CHANGE UP (ref 11.5–14.5)
WBC # BLD: 9.11 K/UL — SIGNIFICANT CHANGE UP (ref 4.8–10.8)
WBC # FLD AUTO: 9.11 K/UL — SIGNIFICANT CHANGE UP (ref 4.8–10.8)

## 2020-12-25 PROCEDURE — 99238 HOSP IP/OBS DSCHRG MGMT 30/<: CPT

## 2020-12-25 RX ORDER — IBUPROFEN 200 MG
1 TABLET ORAL
Qty: 0 | Refills: 0 | DISCHARGE
Start: 2020-12-25

## 2020-12-25 RX ORDER — ACETAMINOPHEN 500 MG
3 TABLET ORAL
Qty: 0 | Refills: 0 | DISCHARGE
Start: 2020-12-25

## 2020-12-25 NOTE — PROGRESS NOTE ADULT - ATTENDING COMMENTS
pt seen and examined by me  chart reviewed, discussed w/ resident  doing well PPD1  labs stable, BPs in normal range now  stable for d/c home today

## 2020-12-25 NOTE — PROGRESS NOTE ADULT - SUBJECTIVE AND OBJECTIVE BOX
Chief Complaint: Postpartum    HPI: Pt doing well, pain well controlled. No overnight events, no acute complaints. Denies headaches, vision changes, SOB, chest pain, RUQ/epigastric pain, or heavy vaginal bleeding    ROS: Denies cardiovascular or respiratory symptoms    PAST MEDICAL & SURGICAL HISTORY:  No pertinent past medical history    History of sleeve gastrectomy        Physical Exam  Vital Signs Last 24 Hrs  T(F): 97.5 (25 Dec 2020 03:20), Max: 98.5 (24 Dec 2020 13:48)  HR: 74 (25 Dec 2020 03:20) (74 - 123)  BP: 127/66 (25 Dec 2020 03:20) (113/58 - 162/75)  RR: 18 (25 Dec 2020 03:20) (18 - 18)    Physical exam:  General - AAOx3, NAD  Abdomen:  - Soft, nontender, nondistended  - Fundus firm, nontender, at the umbilicus  Pelvis/Vagina - Normal Lochia      Labs:                        8.5    9.11  )-----------( 144      ( 25 Dec 2020 05:44 )             25.9                         10.7   6.80  )-----------( 195      ( 23 Dec 2020 09:26 )             32.5     ABO RH Interpretation: A POS (12-23-20 @ 09:52)  Antibody Screen: NEG (12-23-20 @ 09:52)

## 2020-12-25 NOTE — DISCHARGE NOTE OB - CARE PROVIDER_API CALL
Lambert Greenberg)  OBN  [address] [place] [address]  Phone: [phone]  Fax: [phone]  Follow Up Time:

## 2020-12-25 NOTE — PROGRESS NOTE ADULT - ASSESSMENT
26 y/o P1 s/p ,  PPD#1, with gHTN, with BP'ss well controlled, recovering well  - Continue routine postpartum care  - Pain management prn  - Encourage ambulation, oral hydration  - Monitor vitals and bleeding  - anticipate discharge home with VNS    Dr. Saha and OBGYN attending to be made aware     24 y/o P1 s/p ,  PPD#1, with gHTN, with BP'ss well controlled, recovering well  - Continue routine postpartum care  - Pain management prn  - Encourage ambulation, oral hydration  - Monitor vitals and bleeding  - anticipate discharge home with VNS    Dr. Saha and OBGYN attending aware

## 2020-12-25 NOTE — DISCHARGE NOTE OB - PLAN OF CARE
Nothing in the vagina for 6 weeks (no sex, no tampons, no douching). Avoid tub baths, you may shower.  If you have a fever of 100.4F or greater, severe vaginal bleeding, or severe abdominal pain, call your Ob/Gyn or come to the emergency department immediately.  Please follow up with your provider in 1 week for blood pressure check, and in 6 weeks for postpartum visit. Healthy recovery for both mom and baby Please follow up with your provider in 1 week for blood pressure check

## 2020-12-25 NOTE — DISCHARGE NOTE OB - HOSPITAL COURSE
12-25-20 @ 09:04    HPI:  Pt is a 25y.o.  @ 40.1wks by 31wk bert presents for IOL for post-edc. Pt denies ctx, LOF or VB and states good FM    Pt denies signs/ symptoms of covid-19 (23 Dec 2020 09:52)      PAST MEDICAL & SURGICAL HISTORY:  No pertinent past medical history    History of sleeve gastrectomy        POST PARTUM COURSE:   complicated with elevated BP's, now well controlled discharge with VNS       LABS:                        8.5    9.11  )-----------( 144      ( 25 Dec 2020 05:44 )             25.9                         10.7   6.80  )-----------( 195      ( 23 Dec 2020 09:26 )             32.5                   Allergies    No Known Allergies    Intolerances

## 2020-12-25 NOTE — DISCHARGE NOTE OB - CARE PLAN
Principal Discharge DX:	Vaginal delivery  Goal:	Healthy recovery for both mom and baby  Assessment and plan of treatment:	Nothing in the vagina for 6 weeks (no sex, no tampons, no douching). Avoid tub baths, you may shower.  If you have a fever of 100.4F or greater, severe vaginal bleeding, or severe abdominal pain, call your Ob/Gyn or come to the emergency department immediately.  Please follow up with your provider in 1 week for blood pressure check, and in 6 weeks for postpartum visit.  Secondary Diagnosis:	Gestational hypertension  Goal:	Healthy recovery for both mom and baby  Assessment and plan of treatment:	Please follow up with your provider in 1 week for blood pressure check

## 2020-12-25 NOTE — DISCHARGE NOTE OB - PATIENT PORTAL LINK FT
You can access the FollowMyHealth Patient Portal offered by [facility] by registering at the following website: [email or web address]. By joining [employer]’s FollowMyHealth portal, you will also be able to view your health information using other applications (apps) compatible with our system.

## 2020-12-26 LAB
SARS-COV-2 IGG SERPL QL IA: NEGATIVE — SIGNIFICANT CHANGE UP
SARS-COV-2 IGM SERPL IA-ACNC: <0.1 INDEX — SIGNIFICANT CHANGE UP

## 2020-12-29 DIAGNOSIS — O48.0 POST-TERM PREGNANCY: ICD-10-CM

## 2020-12-29 DIAGNOSIS — Z3A.40 40 WEEKS GESTATION OF PREGNANCY: ICD-10-CM

## 2021-01-29 PROBLEM — Z78.9 OTHER SPECIFIED HEALTH STATUS: Chronic | Status: ACTIVE | Noted: 2020-12-23

## 2021-02-22 ENCOUNTER — APPOINTMENT (OUTPATIENT)
Dept: OBGYN | Facility: CLINIC | Age: 26
End: 2021-02-22

## 2023-01-03 NOTE — DISCHARGE NOTE OB - INCREASED VAGINAL BLEEDING OR LARGE CLOTS (SATURATING A PAD AN HOUR)
Discharge instructions given to pt, verbalized understanding.  Refused fluids.  IV removed.  No c/o pain or nausea.  Ambulating out to   per RN in no distress.  
Statement Selected

## 2023-10-24 NOTE — OB RN PATIENT PROFILE - PRO PRENATAL LABS ORI SOURCE ANTIBODY SCREEN
Terbinafine Pregnancy And Lactation Text: This medication is Pregnancy Category B and is considered safe during pregnancy. It is also excreted in breast milk and breast feeding isn't recommended. SCM